# Patient Record
Sex: MALE | Race: WHITE | NOT HISPANIC OR LATINO | URBAN - METROPOLITAN AREA
[De-identification: names, ages, dates, MRNs, and addresses within clinical notes are randomized per-mention and may not be internally consistent; named-entity substitution may affect disease eponyms.]

---

## 2024-07-06 ENCOUNTER — HOSPITAL ENCOUNTER (EMERGENCY)
Facility: MEDICAL CENTER | Age: 39
End: 2024-07-06
Attending: EMERGENCY MEDICINE
Payer: COMMERCIAL

## 2024-07-06 VITALS
OXYGEN SATURATION: 98 % | HEIGHT: 72 IN | DIASTOLIC BLOOD PRESSURE: 92 MMHG | RESPIRATION RATE: 16 BRPM | HEART RATE: 80 BPM | TEMPERATURE: 97.3 F | SYSTOLIC BLOOD PRESSURE: 148 MMHG | WEIGHT: 220 LBS | BODY MASS INDEX: 29.8 KG/M2

## 2024-07-06 DIAGNOSIS — H60.332 ACUTE SWIMMER'S EAR OF LEFT SIDE: ICD-10-CM

## 2024-07-06 PROCEDURE — 96372 THER/PROPH/DIAG INJ SC/IM: CPT

## 2024-07-06 PROCEDURE — 700111 HCHG RX REV CODE 636 W/ 250 OVERRIDE (IP): Mod: JZ | Performed by: EMERGENCY MEDICINE

## 2024-07-06 PROCEDURE — 99283 EMERGENCY DEPT VISIT LOW MDM: CPT

## 2024-07-06 RX ORDER — CIPROFLOXACIN/HYDROCORTISONE 0.2 %-1 %
3 SUSPENSION, DROPS(FINAL DOSAGE FORM)(ML) OTIC (EAR) 2 TIMES DAILY
Qty: 5 ML | Refills: 0 | Status: ACTIVE | OUTPATIENT
Start: 2024-07-06 | End: 2024-07-13

## 2024-07-06 RX ORDER — KETOROLAC TROMETHAMINE 15 MG/ML
15 INJECTION, SOLUTION INTRAMUSCULAR; INTRAVENOUS ONCE
Status: COMPLETED | OUTPATIENT
Start: 2024-07-06 | End: 2024-07-06

## 2024-07-06 RX ADMIN — KETOROLAC TROMETHAMINE 15 MG: 15 INJECTION, SOLUTION INTRAMUSCULAR; INTRAVENOUS at 03:21

## 2024-07-06 ASSESSMENT — PAIN DESCRIPTION - DESCRIPTORS: DESCRIPTORS: SHARP;ACHING

## 2024-07-11 ENCOUNTER — HOSPITAL ENCOUNTER (EMERGENCY)
Facility: MEDICAL CENTER | Age: 39
End: 2024-07-11
Attending: EMERGENCY MEDICINE
Payer: COMMERCIAL

## 2024-07-11 VITALS
HEIGHT: 72 IN | HEART RATE: 74 BPM | SYSTOLIC BLOOD PRESSURE: 151 MMHG | TEMPERATURE: 97.9 F | DIASTOLIC BLOOD PRESSURE: 86 MMHG | RESPIRATION RATE: 16 BRPM | WEIGHT: 235.89 LBS | OXYGEN SATURATION: 99 % | BODY MASS INDEX: 31.95 KG/M2

## 2024-07-11 DIAGNOSIS — H66.92 LEFT OTITIS MEDIA, UNSPECIFIED OTITIS MEDIA TYPE: ICD-10-CM

## 2024-07-11 DIAGNOSIS — R42 DIZZINESS: ICD-10-CM

## 2024-07-11 DIAGNOSIS — H92.02 LEFT EAR PAIN: ICD-10-CM

## 2024-07-11 DIAGNOSIS — H72.92 PERFORATION OF LEFT TYMPANIC MEMBRANE: ICD-10-CM

## 2024-07-11 PROCEDURE — 700102 HCHG RX REV CODE 250 W/ 637 OVERRIDE(OP): Performed by: EMERGENCY MEDICINE

## 2024-07-11 PROCEDURE — 99283 EMERGENCY DEPT VISIT LOW MDM: CPT

## 2024-07-11 PROCEDURE — A9270 NON-COVERED ITEM OR SERVICE: HCPCS | Performed by: EMERGENCY MEDICINE

## 2024-07-11 RX ORDER — MECLIZINE HYDROCHLORIDE 25 MG/1
25 TABLET ORAL 3 TIMES DAILY PRN
Qty: 21 TABLET | Refills: 0 | Status: SHIPPED | OUTPATIENT
Start: 2024-07-11

## 2024-07-11 RX ORDER — AMOXICILLIN AND CLAVULANATE POTASSIUM 875; 125 MG/1; MG/1
1 TABLET, FILM COATED ORAL 2 TIMES DAILY
Qty: 20 TABLET | Refills: 0 | Status: ACTIVE | OUTPATIENT
Start: 2024-07-11

## 2024-07-11 RX ORDER — IBUPROFEN 600 MG/1
600 TABLET ORAL ONCE
Status: COMPLETED | OUTPATIENT
Start: 2024-07-11 | End: 2024-07-11

## 2024-07-11 RX ORDER — KETOROLAC TROMETHAMINE 10 MG/1
10 TABLET, FILM COATED ORAL 3 TIMES DAILY PRN
Qty: 15 TABLET | Refills: 0 | Status: SHIPPED | OUTPATIENT
Start: 2024-07-11

## 2024-07-11 RX ORDER — MECLIZINE HYDROCHLORIDE 25 MG/1
50 TABLET ORAL ONCE
Status: COMPLETED | OUTPATIENT
Start: 2024-07-11 | End: 2024-07-11

## 2024-07-11 RX ORDER — AMOXICILLIN AND CLAVULANATE POTASSIUM 875; 125 MG/1; MG/1
1 TABLET, FILM COATED ORAL ONCE
Status: COMPLETED | OUTPATIENT
Start: 2024-07-11 | End: 2024-07-11

## 2024-07-11 RX ADMIN — AMOXICILLIN AND CLAVULANATE POTASSIUM 1 TABLET: 875; 125 TABLET, FILM COATED ORAL at 21:35

## 2024-07-11 RX ADMIN — MECLIZINE HYDROCHLORIDE 50 MG: 25 TABLET ORAL at 21:36

## 2024-07-11 RX ADMIN — IBUPROFEN 600 MG: 600 TABLET, FILM COATED ORAL at 21:35

## 2024-11-26 ENCOUNTER — OFFICE VISIT (OUTPATIENT)
Dept: URGENT CARE | Facility: CLINIC | Age: 39
End: 2024-11-26
Payer: COMMERCIAL

## 2024-11-26 VITALS
WEIGHT: 228 LBS | HEIGHT: 72 IN | RESPIRATION RATE: 16 BRPM | SYSTOLIC BLOOD PRESSURE: 164 MMHG | OXYGEN SATURATION: 98 % | DIASTOLIC BLOOD PRESSURE: 100 MMHG | BODY MASS INDEX: 30.88 KG/M2 | TEMPERATURE: 98.5 F | HEART RATE: 101 BPM

## 2024-11-26 DIAGNOSIS — M54.40 ACUTE MIDLINE LOW BACK PAIN WITH SCIATICA, SCIATICA LATERALITY UNSPECIFIED: ICD-10-CM

## 2024-11-26 DIAGNOSIS — R03.0 ELEVATED BLOOD PRESSURE READING IN OFFICE WITHOUT DIAGNOSIS OF HYPERTENSION: ICD-10-CM

## 2024-11-26 PROCEDURE — 3080F DIAST BP >= 90 MM HG: CPT

## 2024-11-26 PROCEDURE — 3077F SYST BP >= 140 MM HG: CPT

## 2024-11-26 PROCEDURE — 99204 OFFICE O/P NEW MOD 45 MIN: CPT

## 2024-11-26 RX ORDER — KETOROLAC TROMETHAMINE 15 MG/ML
15 INJECTION, SOLUTION INTRAMUSCULAR; INTRAVENOUS ONCE
Status: COMPLETED | OUTPATIENT
Start: 2024-11-26 | End: 2024-11-26

## 2024-11-26 RX ORDER — ACETAMINOPHEN 500 MG
500 TABLET ORAL ONCE
Status: COMPLETED | OUTPATIENT
Start: 2024-11-26 | End: 2024-11-26

## 2024-11-26 RX ORDER — PREDNISONE 20 MG/1
20 TABLET ORAL 2 TIMES DAILY
Qty: 6 TABLET | Refills: 0 | Status: SHIPPED | OUTPATIENT
Start: 2024-11-26 | End: 2024-11-29

## 2024-11-26 RX ORDER — METHOCARBAMOL 500 MG/1
TABLET, FILM COATED ORAL
Qty: 15 TABLET | Refills: 0 | Status: SHIPPED | OUTPATIENT
Start: 2024-11-26

## 2024-11-26 RX ADMIN — Medication 500 MG: at 12:37

## 2024-11-26 RX ADMIN — KETOROLAC TROMETHAMINE 15 MG: 15 INJECTION, SOLUTION INTRAMUSCULAR; INTRAVENOUS at 12:36

## 2024-11-26 ASSESSMENT — ENCOUNTER SYMPTOMS
ABDOMINAL PAIN: 0
WEAKNESS: 0
FEVER: 0
FALLS: 0
CHILLS: 0
SENSORY CHANGE: 0
NAUSEA: 0
SHORTNESS OF BREATH: 0
BACK PAIN: 1
VOMITING: 0
TINGLING: 0

## 2024-11-26 NOTE — PROGRESS NOTES
Chief Complaint   Patient presents with    Back Pain     Sciatica going down left leg x 1 week, can barely walk       HISTORY OF PRESENT ILLNESS: Patient is a pleasant 39 y.o. male who presents to urgent care today noted ongoing back pain that radiates down the left leg for the last week.  Patient denies any recent trauma.  No pain with urination.  No nausea or vomiting.  He states he has taken ibuprofen, last dose was this morning with little to no relief.  He denies any loss of bowel or bladder.  He is currently ambulatory.    There are no active problems to display for this patient.      Allergies:Patient has no known allergies.    Current Outpatient Medications Ordered in Epic   Medication Sig Dispense Refill    predniSONE (DELTASONE) 20 MG Tab Take 1 Tablet by mouth 2 times a day for 3 days. 6 Tablet 0    methocarbamol (ROBAXIN) 500 MG Tab Take 1 to 2 tablets every 6 hours as needed for pain/muscle spasms. 15 Tablet 0    amoxicillin-clavulanate (AUGMENTIN) 875-125 MG Tab Take 1 Tablet by mouth 2 times a day. With food 20 Tablet 0    ketorolac (TORADOL) 10 MG Tab Take 1 Tablet by mouth 3 times a day as needed for Moderate Pain. With food 15 Tablet 0    meclizine (ANTIVERT) 25 MG Tab Take 1 Tablet by mouth 3 times a day as needed for Vertigo. 21 Tablet 0    hydrocodone-acetaminophen (VICODIN) 5-500 MG TABS Take 1-2 Tabs by mouth every four hours as needed for 20 doses. 20 Each 0     Current Facility-Administered Medications Ordered in Epic   Medication Dose Route Frequency Provider Last Rate Last Admin    ketorolac (Toradol) 15 MG/ML injection 15 mg  15 mg Intramuscular Once         acetaminophen (Tylenol) tablet 500 mg  500 mg Oral Once            Past Medical History:   Diagnosis Date    Allergy        Social History     Tobacco Use    Smoking status: Every Day     Types: Cigarettes    Smokeless tobacco: Never    Tobacco comments:     once in a while   Vaping Use    Vaping status: Never Used   Substance Use  Topics    Alcohol use: Yes     Alcohol/week: 3.5 oz     Types: 7 Cans of beer per week     Comment: daily    Drug use: Not Currently     Comment: marijuana       No family status information on file.   History reviewed. No pertinent family history.    Review of Systems   Constitutional:  Negative for chills, fever and malaise/fatigue.   Respiratory:  Negative for shortness of breath.    Cardiovascular:  Negative for chest pain.   Gastrointestinal:  Negative for abdominal pain, nausea and vomiting.   Genitourinary:  Negative for dysuria, frequency and urgency.   Musculoskeletal:  Positive for back pain. Negative for falls.   Neurological:  Negative for tingling, sensory change and weakness.       Exam:  BP (!) 164/100 (BP Location: Left arm, Patient Position: Sitting, BP Cuff Size: Adult)   Pulse (!) 101   Temp 36.9 °C (98.5 °F) (Temporal)   Resp 16   Ht 1.829 m (6')   Wt 103 kg (228 lb)   SpO2 98%   Physical Exam  Vitals reviewed.   Constitutional:       General: He is not in acute distress.     Appearance: Normal appearance.   HENT:      Head: Normocephalic.      Nose: Nose normal.      Mouth/Throat:      Mouth: Mucous membranes are moist.      Pharynx: Oropharynx is clear.   Eyes:      General:         Right eye: No discharge.         Left eye: No discharge.      Extraocular Movements: Extraocular movements intact.      Conjunctiva/sclera: Conjunctivae normal.      Pupils: Pupils are equal, round, and reactive to light.   Cardiovascular:      Rate and Rhythm: Regular rhythm. Tachycardia present.      Pulses: Normal pulses.      Heart sounds: Normal heart sounds. No murmur heard.     Comments: Noted elevated blood pressure of 164/100  Pulmonary:      Effort: Pulmonary effort is normal. No respiratory distress.      Breath sounds: Normal breath sounds. No wheezing.   Abdominal:      General: Abdomen is flat. Bowel sounds are normal.      Palpations: Abdomen is soft. There is no mass.      Tenderness: There is  no right CVA tenderness or left CVA tenderness.   Musculoskeletal:         General: Tenderness present. Normal range of motion.      Cervical back: Normal range of motion.      Right lower leg: No edema.      Left lower leg: No edema.      Comments: Especially noted with rotation on the left side   Lymphadenopathy:      Cervical: No cervical adenopathy.   Skin:     General: Skin is warm and dry.      Capillary Refill: Capillary refill takes less than 2 seconds.      Findings: No bruising.   Neurological:      General: No focal deficit present.      Mental Status: He is alert.      Sensory: No sensory deficit.      Motor: No weakness.      Coordination: Coordination normal.      Gait: Gait normal.   Psychiatric:         Mood and Affect: Mood normal.         Behavior: Behavior normal.         Assessment/Plan:  1. Acute midline low back pain with sciatica, sciatica laterality unspecified  - ketorolac (Toradol) 15 MG/ML injection 15 mg  - acetaminophen (Tylenol) tablet 500 mg  - predniSONE (DELTASONE) 20 MG Tab; Take 1 Tablet by mouth 2 times a day for 3 days.  Dispense: 6 Tablet; Refill: 0  - methocarbamol (ROBAXIN) 500 MG Tab; Take 1 to 2 tablets every 6 hours as needed for pain/muscle spasms.  Dispense: 15 Tablet; Refill: 0    2. Elevated blood pressure reading in office without diagnosis of hypertension  - Referral back to PCP    Based on physical exam along with review of systems I do not think the patient requires imaging, he has no recent trauma, no loss of bowel or bladder, he is currently ambulatory.  He denies any numbness or tingling.  He notes the pain radiates down his left leg.  Physical exam appears benign for the most part with mild tenderness noted especially with rotation at the left side.  Patient provided with Toradol and Tylenol today here in the office for comfort measures, he denies any recent issues with his kidneys.  Robaxin and a small course of prednisone sent to the pharmacy.  Patient advised  to not drive with Robaxin on board.    Patient has noted elevated hypertension today here in the office.  They are not currently on medications for this.  Education provided on uncontrolled hypertension as well as the need to keep a log.  Referral was placed for primary care, patient advised to follow-up with them and present to the log for ongoing management.  Patient currently denies any symptoms associated with hypertension. Total time spent with the patient 45 minutes to include, review of chart, charting, assessment, procedure.    Supportive care, differential diagnoses, and indications for immediate follow-up discussed with patient.   Pathogenesis of diagnosis discussed including typical length and natural progression.   Instructed to return to clinic or nearest emergency department for any change in condition, further concerns, or worsening of symptoms.  Patient states understanding of the plan of care and discharge instructions.  Instructed to make an appointment, for follow up, with primary care provider.    Please note that this dictation was created using voice recognition software. I have made every reasonable attempt to correct obvious errors, but I expect that there are errors of grammar and possibly content that I did not discover before finalizing the note.      Eunice BHATIA

## 2024-11-26 NOTE — LETTER
[facility]  [facility]  [address]  [place] [address]     November 26, 2024    Patient: Maury Kline   YOB: 1985   Date of Visit: 11/26/2024       To Whom It May Concern:    Maury Kline was seen and treated in our department on 11/26/2024.     Sincerely,     DENISE Babin.

## 2024-11-30 ENCOUNTER — HOSPITAL ENCOUNTER (EMERGENCY)
Facility: MEDICAL CENTER | Age: 39
End: 2024-11-30
Attending: EMERGENCY MEDICINE
Payer: COMMERCIAL

## 2024-11-30 ENCOUNTER — APPOINTMENT (OUTPATIENT)
Dept: RADIOLOGY | Facility: MEDICAL CENTER | Age: 39
End: 2024-11-30
Attending: EMERGENCY MEDICINE
Payer: COMMERCIAL

## 2024-11-30 VITALS
HEART RATE: 89 BPM | WEIGHT: 211.86 LBS | BODY MASS INDEX: 28.7 KG/M2 | TEMPERATURE: 97.5 F | RESPIRATION RATE: 18 BRPM | OXYGEN SATURATION: 95 % | DIASTOLIC BLOOD PRESSURE: 81 MMHG | SYSTOLIC BLOOD PRESSURE: 161 MMHG | HEIGHT: 72 IN

## 2024-11-30 DIAGNOSIS — M51.26 HERNIATION OF INTERVERTEBRAL DISC BETWEEN L4 AND L5: ICD-10-CM

## 2024-11-30 DIAGNOSIS — M54.32 SCIATICA OF LEFT SIDE: ICD-10-CM

## 2024-11-30 LAB
ALBUMIN SERPL BCP-MCNC: 4.5 G/DL (ref 3.2–4.9)
ALBUMIN/GLOB SERPL: 1.5 G/DL
ALP SERPL-CCNC: 61 U/L (ref 30–99)
ALT SERPL-CCNC: 36 U/L (ref 2–50)
ANION GAP SERPL CALC-SCNC: 16 MMOL/L (ref 7–16)
AST SERPL-CCNC: 27 U/L (ref 12–45)
BASOPHILS # BLD AUTO: 0.9 % (ref 0–1.8)
BASOPHILS # BLD: 0.08 K/UL (ref 0–0.12)
BILIRUB SERPL-MCNC: 0.8 MG/DL (ref 0.1–1.5)
BUN SERPL-MCNC: 12 MG/DL (ref 8–22)
CALCIUM ALBUM COR SERPL-MCNC: 9 MG/DL (ref 8.5–10.5)
CALCIUM SERPL-MCNC: 9.4 MG/DL (ref 8.4–10.2)
CHLORIDE SERPL-SCNC: 101 MMOL/L (ref 96–112)
CO2 SERPL-SCNC: 23 MMOL/L (ref 20–33)
CREAT SERPL-MCNC: 0.73 MG/DL (ref 0.5–1.4)
CRP SERPL HS-MCNC: 0.3 MG/L (ref 0–3)
EOSINOPHIL # BLD AUTO: 0.03 K/UL (ref 0–0.51)
EOSINOPHIL NFR BLD: 0.3 % (ref 0–6.9)
ERYTHROCYTE [DISTWIDTH] IN BLOOD BY AUTOMATED COUNT: 41 FL (ref 35.9–50)
ERYTHROCYTE [SEDIMENTATION RATE] IN BLOOD BY WESTERGREN METHOD: 0 MM/HOUR (ref 0–20)
GFR SERPLBLD CREATININE-BSD FMLA CKD-EPI: 120 ML/MIN/1.73 M 2
GLOBULIN SER CALC-MCNC: 3.1 G/DL (ref 1.9–3.5)
GLUCOSE SERPL-MCNC: 96 MG/DL (ref 65–99)
HCT VFR BLD AUTO: 50.2 % (ref 42–52)
HGB BLD-MCNC: 17.8 G/DL (ref 14–18)
IMM GRANULOCYTES # BLD AUTO: 0.06 K/UL (ref 0–0.11)
IMM GRANULOCYTES NFR BLD AUTO: 0.7 % (ref 0–0.9)
LYMPHOCYTES # BLD AUTO: 1.12 K/UL (ref 1–4.8)
LYMPHOCYTES NFR BLD: 12.8 % (ref 22–41)
MCH RBC QN AUTO: 32.7 PG (ref 27–33)
MCHC RBC AUTO-ENTMCNC: 35.5 G/DL (ref 32.3–36.5)
MCV RBC AUTO: 92.3 FL (ref 81.4–97.8)
MONOCYTES # BLD AUTO: 0.76 K/UL (ref 0–0.85)
MONOCYTES NFR BLD AUTO: 8.7 % (ref 0–13.4)
NEUTROPHILS # BLD AUTO: 6.72 K/UL (ref 1.82–7.42)
NEUTROPHILS NFR BLD: 76.6 % (ref 44–72)
NRBC # BLD AUTO: 0 K/UL
NRBC BLD-RTO: 0 /100 WBC (ref 0–0.2)
PLATELET # BLD AUTO: 279 K/UL (ref 164–446)
PMV BLD AUTO: 8.7 FL (ref 9–12.9)
POTASSIUM SERPL-SCNC: 3.7 MMOL/L (ref 3.6–5.5)
PROT SERPL-MCNC: 7.6 G/DL (ref 6–8.2)
RBC # BLD AUTO: 5.44 M/UL (ref 4.7–6.1)
SODIUM SERPL-SCNC: 140 MMOL/L (ref 135–145)
WBC # BLD AUTO: 8.8 K/UL (ref 4.8–10.8)

## 2024-11-30 PROCEDURE — 96374 THER/PROPH/DIAG INJ IV PUSH: CPT

## 2024-11-30 PROCEDURE — 80053 COMPREHEN METABOLIC PANEL: CPT

## 2024-11-30 PROCEDURE — 85652 RBC SED RATE AUTOMATED: CPT

## 2024-11-30 PROCEDURE — 700102 HCHG RX REV CODE 250 W/ 637 OVERRIDE(OP): Performed by: STUDENT IN AN ORGANIZED HEALTH CARE EDUCATION/TRAINING PROGRAM

## 2024-11-30 PROCEDURE — 700111 HCHG RX REV CODE 636 W/ 250 OVERRIDE (IP): Mod: JZ | Performed by: EMERGENCY MEDICINE

## 2024-11-30 PROCEDURE — 36415 COLL VENOUS BLD VENIPUNCTURE: CPT

## 2024-11-30 PROCEDURE — 700111 HCHG RX REV CODE 636 W/ 250 OVERRIDE (IP): Performed by: STUDENT IN AN ORGANIZED HEALTH CARE EDUCATION/TRAINING PROGRAM

## 2024-11-30 PROCEDURE — 72148 MRI LUMBAR SPINE W/O DYE: CPT

## 2024-11-30 PROCEDURE — 96375 TX/PRO/DX INJ NEW DRUG ADDON: CPT

## 2024-11-30 PROCEDURE — 86141 C-REACTIVE PROTEIN HS: CPT

## 2024-11-30 PROCEDURE — 99285 EMERGENCY DEPT VISIT HI MDM: CPT

## 2024-11-30 PROCEDURE — A9270 NON-COVERED ITEM OR SERVICE: HCPCS | Performed by: STUDENT IN AN ORGANIZED HEALTH CARE EDUCATION/TRAINING PROGRAM

## 2024-11-30 PROCEDURE — 96376 TX/PRO/DX INJ SAME DRUG ADON: CPT

## 2024-11-30 PROCEDURE — 85025 COMPLETE CBC W/AUTO DIFF WBC: CPT

## 2024-11-30 RX ORDER — DIAZEPAM 10 MG/2ML
5 INJECTION, SOLUTION INTRAMUSCULAR; INTRAVENOUS ONCE
Status: COMPLETED | OUTPATIENT
Start: 2024-11-30 | End: 2024-11-30

## 2024-11-30 RX ORDER — ACETAMINOPHEN 500 MG
1000 TABLET ORAL ONCE
Status: COMPLETED | OUTPATIENT
Start: 2024-11-30 | End: 2024-11-30

## 2024-11-30 RX ORDER — DOCUSATE SODIUM 100 MG/1
100 CAPSULE, LIQUID FILLED ORAL 2 TIMES DAILY PRN
Qty: 30 CAPSULE | Refills: 0 | Status: SHIPPED | OUTPATIENT
Start: 2024-11-30

## 2024-11-30 RX ORDER — IBUPROFEN 600 MG/1
600 TABLET, FILM COATED ORAL EVERY 6 HOURS PRN
Qty: 30 TABLET | Refills: 0 | Status: SHIPPED | OUTPATIENT
Start: 2024-11-30

## 2024-11-30 RX ORDER — LIDOCAINE 4 G/G
1 PATCH TOPICAL EVERY 12 HOURS
Qty: 1 PATCH | Refills: 0 | Status: SHIPPED | OUTPATIENT
Start: 2024-11-30

## 2024-11-30 RX ORDER — METHOCARBAMOL 500 MG/1
500 TABLET, FILM COATED ORAL ONCE
Status: COMPLETED | OUTPATIENT
Start: 2024-11-30 | End: 2024-11-30

## 2024-11-30 RX ORDER — MORPHINE SULFATE 4 MG/ML
4 INJECTION INTRAVENOUS ONCE
Status: COMPLETED | OUTPATIENT
Start: 2024-11-30 | End: 2024-11-30

## 2024-11-30 RX ORDER — KETOROLAC TROMETHAMINE 15 MG/ML
15 INJECTION, SOLUTION INTRAMUSCULAR; INTRAVENOUS ONCE
Status: COMPLETED | OUTPATIENT
Start: 2024-11-30 | End: 2024-11-30

## 2024-11-30 RX ORDER — MORPHINE SULFATE 4 MG/ML
4 INJECTION INTRAVENOUS ONCE
Status: DISCONTINUED | OUTPATIENT
Start: 2024-11-30 | End: 2024-11-30

## 2024-11-30 RX ORDER — HYDROMORPHONE HYDROCHLORIDE 1 MG/ML
1 INJECTION, SOLUTION INTRAMUSCULAR; INTRAVENOUS; SUBCUTANEOUS ONCE
Status: COMPLETED | OUTPATIENT
Start: 2024-11-30 | End: 2024-11-30

## 2024-11-30 RX ORDER — OXYCODONE HYDROCHLORIDE 5 MG/1
5-10 TABLET ORAL EVERY 6 HOURS PRN
Qty: 20 TABLET | Refills: 0 | Status: SHIPPED | OUTPATIENT
Start: 2024-11-30 | End: 2024-12-05

## 2024-11-30 RX ORDER — ONDANSETRON 2 MG/ML
4 INJECTION INTRAMUSCULAR; INTRAVENOUS ONCE
Status: COMPLETED | OUTPATIENT
Start: 2024-11-30 | End: 2024-11-30

## 2024-11-30 RX ORDER — ALBUTEROL SULFATE 0.83 MG/ML
2.5 SOLUTION RESPIRATORY (INHALATION) EVERY 4 HOURS PRN
Qty: 30 EACH | Refills: 0 | Status: SHIPPED | OUTPATIENT
Start: 2024-11-30

## 2024-11-30 RX ORDER — METHOCARBAMOL 750 MG/1
750 TABLET, FILM COATED ORAL EVERY 6 HOURS PRN
Qty: 20 TABLET | Refills: 0 | Status: SHIPPED | OUTPATIENT
Start: 2024-11-30 | End: 2024-12-16 | Stop reason: SDUPTHER

## 2024-11-30 RX ADMIN — KETOROLAC TROMETHAMINE 15 MG: 15 INJECTION, SOLUTION INTRAMUSCULAR; INTRAVENOUS at 14:19

## 2024-11-30 RX ADMIN — METHOCARBAMOL 500 MG: 500 TABLET ORAL at 16:00

## 2024-11-30 RX ADMIN — MORPHINE SULFATE 4 MG: 4 INJECTION, SOLUTION INTRAMUSCULAR; INTRAVENOUS at 14:19

## 2024-11-30 RX ADMIN — ONDANSETRON 4 MG: 2 INJECTION INTRAMUSCULAR; INTRAVENOUS at 10:40

## 2024-11-30 RX ADMIN — DIAZEPAM 5 MG: 10 INJECTION, SOLUTION INTRAMUSCULAR; INTRAVENOUS at 13:45

## 2024-11-30 RX ADMIN — DIAZEPAM 5 MG: 5 INJECTION, SOLUTION INTRAMUSCULAR; INTRAVENOUS at 12:43

## 2024-11-30 RX ADMIN — HYDROMORPHONE HYDROCHLORIDE 1 MG: 1 INJECTION, SOLUTION INTRAMUSCULAR; INTRAVENOUS; SUBCUTANEOUS at 16:00

## 2024-11-30 RX ADMIN — MORPHINE SULFATE 4 MG: 4 INJECTION, SOLUTION INTRAMUSCULAR; INTRAVENOUS at 10:40

## 2024-11-30 RX ADMIN — ACETAMINOPHEN 1000 MG: 500 TABLET ORAL at 16:00

## 2024-11-30 NOTE — DISCHARGE INSTRUCTIONS
Please call for appointment with spine surgeon.  Finish your prescription of steroids as scheduled.  If worse, new numbness or weakness, loss of bowel or bladder continence, go to [facility] emergency department

## 2024-11-30 NOTE — ED TRIAGE NOTES
BP (!) 163/111   Pulse (!) 109   Temp 36.4 °C (97.5 °F) (Temporal)   Resp 20   Ht 1.829 m (6')   Wt 96.1 kg (211 lb 13.8 oz)   SpO2 95%   BMI 28.73 kg/m²   Chief Complaint   Patient presents with    Pain     L buttock pain that started about a week ago   was seen at  this past week  the gave him a shot that did not work   denies injury or cause of pain   progressively getting worse   having numbness and tingling down L leg  no loss of bowel or bladder the last couple of days      Comes in w/ wife   also was given steroid medication from   all med's not working  continues to have severe pain

## 2024-11-30 NOTE — ED PROVIDER NOTES
ED Provider Note    CHIEF COMPLAINT  Chief Complaint   Patient presents with    Pain     L buttock pain that started about a week ago   was seen at  this past week  the gave him a shot that did not work   denies injury or cause of pain   progressively getting worse   having numbness and tingling down L leg  no loss of bowel or bladder the last couple of days        EXTERNAL RECORDS REVIEWED  PDMP no active prescriptions for narcotics or sedatives    HPI/ROS  LIMITATION TO HISTORY   Select: : None  OUTSIDE HISTORIAN(S):  Significant other patient's wife is at bedside for discussion history and symptoms, treatment up-to-date from the urgent care    Maury Kline is a 39 y.o. male who presents with left leg lumbar pain radiating to the left buttock down the left leg to the mid calf.  Slight tingling and numbness to the left foot.  He feels weak in his left leg, also pain markedly increases with any attempt to bear weight.  No trauma.  No recent surgery, no diabetes, no IVDA.  He denies bowel or bladder incontinence, no saddle paresthesias.  Patient had similar discomfort while pushing heavy object 2 years ago, resolved after medication.  He went to an urgent care 3 days ago, placed on prednisone, muscle relaxer, and administered Toradol shot.  He states the discomfort and weakness have worsened despite the medication.  No abdominal pain.    PAST MEDICAL HISTORY   has a past medical history of Allergy.    SURGICAL HISTORY   has a past surgical history that includes tonsillectomy and other orthopedic surgery.    FAMILY HISTORY  History reviewed. No pertinent family history.    SOCIAL HISTORY  Social History     Tobacco Use    Smoking status: Never    Smokeless tobacco: Never    Tobacco comments:     once in a while   Vaping Use    Vaping status: Never Used   Substance and Sexual Activity    Alcohol use: Yes     Alcohol/week: 3.5 oz     Types: 7 Cans of beer per week     Comment: daily    Drug use: Not Currently     Sexual activity: Yes     Partners: Female       CURRENT MEDICATIONS  Home Medications       Reviewed by Cyndy Strauss R.N. (Registered Nurse) on 11/30/24 at 0921  Med List Status: Partial     Medication Last Dose Status   amoxicillin-clavulanate (AUGMENTIN) 875-125 MG Tab  Active   hydrocodone-acetaminophen (VICODIN) 5-500 MG TABS  Active   ketorolac (TORADOL) 10 MG Tab  Active   meclizine (ANTIVERT) 25 MG Tab  Active   methocarbamol (ROBAXIN) 500 MG Tab  Active                    ALLERGIES  No Known Allergies    PHYSICAL EXAM  VITAL SIGNS: BP (!) 163/111   Pulse (!) 109   Temp 36.4 °C (97.5 °F) (Temporal)   Resp 20   Ht 1.829 m (6')   Wt 96.1 kg (211 lb 13.8 oz)   SpO2 95%   BMI 28.73 kg/m²    Neurologic: Proximal and lower right leg weakness is mild compared to left.  There is increase in pain with range of motion possibly affecting his strength.  Sensation is intact to the right foot.  Vascular: Normal right dorsal pedis pulse  Skin: No shingles, no pallor, no bruising  GI: Abdomen is soft and nontender  Cardiac: Tachycardic rate  Eyes: No scleral icterus, pupils are equal  Psychiatric: Normal mood, cooperative    EKG/LABS  Results for orders placed or performed during the hospital encounter of 11/30/24   CBC WITH DIFFERENTIAL    Collection Time: 11/30/24 10:34 AM   Result Value Ref Range    WBC 8.8 4.8 - 10.8 K/uL    RBC 5.44 4.70 - 6.10 M/uL    Hemoglobin 17.8 14.0 - 18.0 g/dL    Hematocrit 50.2 42.0 - 52.0 %    MCV 92.3 81.4 - 97.8 fL    MCH 32.7 27.0 - 33.0 pg    MCHC 35.5 32.3 - 36.5 g/dL    RDW 41.0 35.9 - 50.0 fL    Platelet Count 279 164 - 446 K/uL    MPV 8.7 (L) 9.0 - 12.9 fL    Neutrophils-Polys 76.60 (H) 44.00 - 72.00 %    Lymphocytes 12.80 (L) 22.00 - 41.00 %    Monocytes 8.70 0.00 - 13.40 %    Eosinophils 0.30 0.00 - 6.90 %    Basophils 0.90 0.00 - 1.80 %    Immature Granulocytes 0.70 0.00 - 0.90 %    Nucleated RBC 0.00 0.00 - 0.20 /100 WBC    Neutrophils (Absolute) 6.72 1.82 - 7.42 K/uL     Lymphs (Absolute) 1.12 1.00 - 4.80 K/uL    Monos (Absolute) 0.76 0.00 - 0.85 K/uL    Eos (Absolute) 0.03 0.00 - 0.51 K/uL    Baso (Absolute) 0.08 0.00 - 0.12 K/uL    Immature Granulocytes (abs) 0.06 0.00 - 0.11 K/uL    NRBC (Absolute) 0.00 K/uL   COMP METABOLIC PANEL    Collection Time: 11/30/24 10:34 AM   Result Value Ref Range    Sodium 140 135 - 145 mmol/L    Potassium 3.7 3.6 - 5.5 mmol/L    Chloride 101 96 - 112 mmol/L    Co2 23 20 - 33 mmol/L    Anion Gap 16.0 7.0 - 16.0    Glucose 96 65 - 99 mg/dL    Bun 12 8 - 22 mg/dL    Creatinine 0.73 0.50 - 1.40 mg/dL    Calcium 9.4 8.4 - 10.2 mg/dL    Correct Calcium 9.0 8.5 - 10.5 mg/dL    AST(SGOT) 27 12 - 45 U/L    ALT(SGPT) 36 2 - 50 U/L    Alkaline Phosphatase 61 30 - 99 U/L    Total Bilirubin 0.8 0.1 - 1.5 mg/dL    Albumin 4.5 3.2 - 4.9 g/dL    Total Protein 7.6 6.0 - 8.2 g/dL    Globulin 3.1 1.9 - 3.5 g/dL    A-G Ratio 1.5 g/dL   Sed Rate    Collection Time: 11/30/24 10:34 AM   Result Value Ref Range    Sed Rate Westergren 0 0 - 20 mm/hour   CRP HIGH SENSITIVE (CARDIAC)    Collection Time: 11/30/24 10:34 AM   Result Value Ref Range    C Reactive Protein High Sensitive 0.3 0.0 - 3.0 mg/L   ESTIMATED GFR    Collection Time: 11/30/24 10:34 AM   Result Value Ref Range    GFR (CKD-EPI) 120 >60 mL/min/1.73 m 2       I have independently interpreted this EKG    RADIOLOGY/PROCEDURES   I have independently interpreted the diagnostic imaging associated with this visit and am waiting the final reading from the radiologist.   My preliminary interpretation is as follows: MRI of the lumbar spine shows evidence of disc herniation at L4-5    Radiologist interpretation:  MR-LUMBAR SPINE-W/O   Final Result      1.  Probable acute left posterior paramedian disc herniation with small extruded disc fragment extending caudally in the ventral epidural space proximal 0.9 mm. There is slight extension of the disc fragment into the left L4-5 inferior foraminal recess    which may  compress left L4 nerve root as it exits the canal as well as additional left-sided descending nerve roots.   2.  Additional mild degenerative changes at the L5-S1 level.   3.  No acute fracture or malalignment.          COURSE & MEDICAL DECISION MAKING    ASSESSMENT, COURSE AND PLAN  Care Narrative: Patient presents with progressively worsening pain despite conservative outpatient management including steroids and muscle relaxers.  His pain was treated twice with morphine.  He suffered panic attack while in the MRI machine, had to go back twice, and has been treated with a total of 10 mg IV Valium ultimately allowing him to get the study.  MRI shows finding of herniated disc with small herniated fragment compressing the left L4 nerve root, likely etiology of his sciatica symptoms.  There is no evidence of cauda equina syndrome.  Patient is still having difficulty moving.  I have advanced his medications at home, prescribing oxycodone in addition to refilling his muscle relaxer.  At this time,'s patient states he would prefer to go home.  I have referred him to spine surgery on-call for follow-up and provided return instructions, directing him to [facility] should symptoms worsen.  If needed, there would be consideration for admission for pain control, plan however is outpatient management at this time after discussion with the patient    Narcotics Script: In prescribing controlled substances to this patient, I certify that I have obtained and reviewed the medical history of Maury Kline. I have also made a good evangelista effort to obtain applicable records from other providers who have treated the patient and records did not demonstrate any increased risk of substance abuse that would prevent me from prescribing controlled substances.     I have conducted a physical exam and documented it. I have reviewed Mr. Kline’s prescription history as maintained by the [place] Prescription Monitoring Program.      I have assessed the patient’s risk for abuse, dependency, and addiction using the validated Opioid Risk Tool available at https://www.mdcalc.com/zmbtrv-giss-jkny-ort-narcotic-abuse.     Given the above, I believe the benefits of controlled substance therapy outweigh the risks. The reasons for prescribing controlled substances include non-narcotic, oral analgesic alternatives have been inadequate for pain control. Accordingly, I have discussed the risk and benefits, treatment plan, and alternative therapies with the patient.           ADDITIONAL PROBLEMS MANAGED  Atraumatic back pain: No evidence of infection at this time, MRI negative for epidural abscess, patient has negative CRP, sedimentation rate, and white blood cell count.    DISPOSITION AND DISCUSSIONS    Escalation of care considered, and ultimately not performed:acute inpatient care management, however at this time, the patient is most appropriate for outpatient management    Barriers to care at this time, including but not limited to:  None .     Decision tools and prescription drugs considered including, but not limited to: Antibiotics not indicated, no evidence of epidural abscess .    FINAL DIAGNOSIS  1. Sciatica of left side    2. Herniation of intervertebral disc between L4 and L5         Electronically signed by: Ulises Holcomb M.D., 11/30/2024 10:22 AM

## 2024-12-01 NOTE — ED NOTES
Dc instructions and medications discussed with patient at bedside. All questions answered at this time. VSS. No IV in place at this time. Pt to lobby without incident. spouse to drive patient home.     
MRI requesting sedation medication for claustrophobia for 2nd attempt at MRI. Primary RN and ERP notified.   
Pt. Unable to get OOB d/t pain. Dr. Holcomb aware and at bedside for re-eval.   
No

## 2024-12-02 ENCOUNTER — APPOINTMENT (OUTPATIENT)
Dept: MEDICAL GROUP | Facility: LAB | Age: 39
End: 2024-12-02
Payer: COMMERCIAL

## 2024-12-02 VITALS
DIASTOLIC BLOOD PRESSURE: 74 MMHG | SYSTOLIC BLOOD PRESSURE: 146 MMHG | WEIGHT: 229 LBS | HEIGHT: 72 IN | BODY MASS INDEX: 31.02 KG/M2 | TEMPERATURE: 96.8 F | OXYGEN SATURATION: 96 % | HEART RATE: 109 BPM

## 2024-12-02 DIAGNOSIS — M54.10 BACK PAIN WITH LEFT-SIDED RADICULOPATHY: ICD-10-CM

## 2024-12-02 DIAGNOSIS — Z76.89 ESTABLISHING CARE WITH NEW DOCTOR, ENCOUNTER FOR: ICD-10-CM

## 2024-12-02 PROCEDURE — 99203 OFFICE O/P NEW LOW 30 MIN: CPT | Performed by: INTERNAL MEDICINE

## 2024-12-02 SDOH — ECONOMIC STABILITY: INCOME INSECURITY: HOW HARD IS IT FOR YOU TO PAY FOR THE VERY BASICS LIKE FOOD, HOUSING, MEDICAL CARE, AND HEATING?: NOT HARD AT ALL

## 2024-12-02 SDOH — ECONOMIC STABILITY: TRANSPORTATION INSECURITY
IN THE PAST 12 MONTHS, HAS LACK OF TRANSPORTATION KEPT YOU FROM MEETINGS, WORK, OR FROM GETTING THINGS NEEDED FOR DAILY LIVING?: NO

## 2024-12-02 SDOH — ECONOMIC STABILITY: TRANSPORTATION INSECURITY
IN THE PAST 12 MONTHS, HAS THE LACK OF TRANSPORTATION KEPT YOU FROM MEDICAL APPOINTMENTS OR FROM GETTING MEDICATIONS?: NO

## 2024-12-02 SDOH — HEALTH STABILITY: MENTAL HEALTH
STRESS IS WHEN SOMEONE FEELS TENSE, NERVOUS, ANXIOUS, OR CAN'T SLEEP AT NIGHT BECAUSE THEIR MIND IS TROUBLED. HOW STRESSED ARE YOU?: ONLY A LITTLE

## 2024-12-02 SDOH — ECONOMIC STABILITY: HOUSING INSECURITY
IN THE LAST 12 MONTHS, WAS THERE A TIME WHEN YOU DID NOT HAVE A STEADY PLACE TO SLEEP OR SLEPT IN A SHELTER (INCLUDING NOW)?: NO

## 2024-12-02 SDOH — ECONOMIC STABILITY: FOOD INSECURITY: WITHIN THE PAST 12 MONTHS, YOU WORRIED THAT YOUR FOOD WOULD RUN OUT BEFORE YOU GOT MONEY TO BUY MORE.: NEVER TRUE

## 2024-12-02 SDOH — HEALTH STABILITY: PHYSICAL HEALTH: ON AVERAGE, HOW MANY DAYS PER WEEK DO YOU ENGAGE IN MODERATE TO STRENUOUS EXERCISE (LIKE A BRISK WALK)?: 3 DAYS

## 2024-12-02 SDOH — HEALTH STABILITY: PHYSICAL HEALTH: ON AVERAGE, HOW MANY MINUTES DO YOU ENGAGE IN EXERCISE AT THIS LEVEL?: 30 MIN

## 2024-12-02 SDOH — ECONOMIC STABILITY: INCOME INSECURITY: IN THE LAST 12 MONTHS, WAS THERE A TIME WHEN YOU WERE NOT ABLE TO PAY THE MORTGAGE OR RENT ON TIME?: NO

## 2024-12-02 SDOH — ECONOMIC STABILITY: FOOD INSECURITY: WITHIN THE PAST 12 MONTHS, THE FOOD YOU BOUGHT JUST DIDN'T LAST AND YOU DIDN'T HAVE MONEY TO GET MORE.: NEVER TRUE

## 2024-12-02 SDOH — ECONOMIC STABILITY: TRANSPORTATION INSECURITY
IN THE PAST 12 MONTHS, HAS LACK OF RELIABLE TRANSPORTATION KEPT YOU FROM MEDICAL APPOINTMENTS, MEETINGS, WORK OR FROM GETTING THINGS NEEDED FOR DAILY LIVING?: NO

## 2024-12-02 ASSESSMENT — SOCIAL DETERMINANTS OF HEALTH (SDOH)
HOW OFTEN DO YOU ATTENT MEETINGS OF THE CLUB OR ORGANIZATION YOU BELONG TO?: PATIENT DECLINED
HOW MANY DRINKS CONTAINING ALCOHOL DO YOU HAVE ON A TYPICAL DAY WHEN YOU ARE DRINKING: 1 OR 2
HOW OFTEN DO YOU ATTEND CHURCH OR RELIGIOUS SERVICES?: PATIENT DECLINED
HOW OFTEN DO YOU GET TOGETHER WITH FRIENDS OR RELATIVES?: THREE TIMES A WEEK
WITHIN THE PAST 12 MONTHS, YOU WORRIED THAT YOUR FOOD WOULD RUN OUT BEFORE YOU GOT THE MONEY TO BUY MORE: NEVER TRUE
DO YOU BELONG TO ANY CLUBS OR ORGANIZATIONS SUCH AS CHURCH GROUPS UNIONS, FRATERNAL OR ATHLETIC GROUPS, OR SCHOOL GROUPS?: PATIENT DECLINED
IN A TYPICAL WEEK, HOW MANY TIMES DO YOU TALK ON THE PHONE WITH FAMILY, FRIENDS, OR NEIGHBORS?: MORE THAN THREE TIMES A WEEK
HOW OFTEN DO YOU ATTENT MEETINGS OF THE CLUB OR ORGANIZATION YOU BELONG TO?: PATIENT DECLINED
HOW OFTEN DO YOU GET TOGETHER WITH FRIENDS OR RELATIVES?: THREE TIMES A WEEK
IN A TYPICAL WEEK, HOW MANY TIMES DO YOU TALK ON THE PHONE WITH FAMILY, FRIENDS, OR NEIGHBORS?: MORE THAN THREE TIMES A WEEK
HOW OFTEN DO YOU ATTEND CHURCH OR RELIGIOUS SERVICES?: PATIENT DECLINED
DO YOU BELONG TO ANY CLUBS OR ORGANIZATIONS SUCH AS CHURCH GROUPS UNIONS, FRATERNAL OR ATHLETIC GROUPS, OR SCHOOL GROUPS?: PATIENT DECLINED
HOW HARD IS IT FOR YOU TO PAY FOR THE VERY BASICS LIKE FOOD, HOUSING, MEDICAL CARE, AND HEATING?: NOT HARD AT ALL
HOW OFTEN DO YOU HAVE A DRINK CONTAINING ALCOHOL: 4 OR MORE TIMES A WEEK
HOW OFTEN DO YOU HAVE SIX OR MORE DRINKS ON ONE OCCASION: LESS THAN MONTHLY
IN THE PAST 12 MONTHS, HAS THE ELECTRIC, GAS, OIL, OR WATER COMPANY THREATENED TO SHUT OFF SERVICE IN YOUR HOME?: NO

## 2024-12-02 ASSESSMENT — PATIENT HEALTH QUESTIONNAIRE - PHQ9: CLINICAL INTERPRETATION OF PHQ2 SCORE: 0

## 2024-12-02 ASSESSMENT — LIFESTYLE VARIABLES
AUDIT-C TOTAL SCORE: 5
SKIP TO QUESTIONS 9-10: 0
HOW OFTEN DO YOU HAVE SIX OR MORE DRINKS ON ONE OCCASION: LESS THAN MONTHLY
HOW OFTEN DO YOU HAVE A DRINK CONTAINING ALCOHOL: 4 OR MORE TIMES A WEEK
HOW MANY STANDARD DRINKS CONTAINING ALCOHOL DO YOU HAVE ON A TYPICAL DAY: 1 OR 2

## 2024-12-02 ASSESSMENT — FIBROSIS 4 INDEX: FIB4 SCORE: .5967741935483870968

## 2024-12-02 NOTE — PROGRESS NOTES
CC: Maury Kline is a 39 y.o. male is suffering from   Chief Complaint   Patient presents with    Establish Care     Lower back pain         SUBJECTIVE:  1. Establishing care with new doctor, encounter for  Patient is here for establishment of care recently hospitalized secondary to severe back pain later found to have on MRI a disc herniation with a fragment into the left L4-5 foraminal recess    2. Back pain with left-sided radiculopathy    Intensity 10/10 on Saturday currently 5-6/10, quality sharp electrical , radiation left leg, associated signs and symptoms alyssa bowel bladder function, time component worse in the morning morning, improved with limited activity, worse with bending. .        Past social, family, history: , works in computer science  Social History     Tobacco Use    Smoking status: Never    Smokeless tobacco: Never    Tobacco comments:     once in a while   Vaping Use    Vaping status: Never Used   Substance Use Topics    Alcohol use: Yes     Alcohol/week: 4.2 oz     Types: 7 Cans of beer per week     Comment: daily    Drug use: Not Currently         MEDICATIONS:    Current Outpatient Medications:     oxyCODONE immediate-release (ROXICODONE) 5 MG Tab, Take 1-2 Tablets by mouth every 6 hours as needed for Severe Pain for up to 5 days., Disp: 20 Tablet, Rfl: 0    methocarbamol (ROBAXIN) 750 MG Tab, Take 1 Tablet by mouth every 6 hours as needed (Muscle spasm)., Disp: 20 Tablet, Rfl: 0    albuterol (PROVENTIL) 2.5mg/3ml Nebu Soln solution for nebulization, Take 3 mL by nebulization every four hours as needed for Shortness of Breath., Disp: 30 Each, Rfl: 0    ibuprofen (MOTRIN) 600 MG Tab, Take 1 Tablet by mouth every 6 hours as needed for Mild Pain or Moderate Pain., Disp: 30 Tablet, Rfl: 0    lidocaine (ASPERFLEX) 4 % Patch, Place 1 Patch on the skin every 12 hours., Disp: 1 Patch, Rfl: 0    docusate sodium (COLACE) 100 MG Cap, Take 1 Capsule by mouth 2 times a day as needed for  Constipation (Take to prevent opioid-induced constipation)., Disp: 30 Capsule, Rfl: 0    methocarbamol (ROBAXIN) 500 MG Tab, Take 1 to 2 tablets every 6 hours as needed for pain/muscle spasms. (Patient not taking: Reported on 12/2/2024), Disp: 15 Tablet, Rfl: 0    amoxicillin-clavulanate (AUGMENTIN) 875-125 MG Tab, Take 1 Tablet by mouth 2 times a day. With food (Patient not taking: Reported on 12/2/2024), Disp: 20 Tablet, Rfl: 0    ketorolac (TORADOL) 10 MG Tab, Take 1 Tablet by mouth 3 times a day as needed for Moderate Pain. With food (Patient not taking: Reported on 12/2/2024), Disp: 15 Tablet, Rfl: 0    meclizine (ANTIVERT) 25 MG Tab, Take 1 Tablet by mouth 3 times a day as needed for Vertigo. (Patient not taking: Reported on 12/2/2024), Disp: 21 Tablet, Rfl: 0    hydrocodone-acetaminophen (VICODIN) 5-500 MG TABS, Take 1-2 Tabs by mouth every four hours as needed for 20 doses. (Patient not taking: Reported on 12/2/2024), Disp: 20 Each, Rfl: 0    PROBLEMS:  There are no active problems to display for this patient.      REVIEW OF SYSTEMS:  General: Patient denies any problems with nausea vomiting fever chills chest pain or tightness.  Head:  No history of strokes seizures severe head trauma or loss of consciousness.   HEENT: No history of any significant vision loss, hearing loss, changes in sense of smell hoarseness  Heart: No chest pain tightness squeezing.   Lungs: No recurrent asthma bronchitis pneumonia.   Gastrointestinal: No history of black or bloody stools.  Denies rectal numbness  Genitourinary:  No increased frequency urgency pain and burning with urination denies numbness in the perineum  Musculoskeletal: No joint pain or discomfort.   Skin: No skin changes      PHYSICAL EXAM      Constitutional: Alert, cooperative, not in acute distress.  Cardiovascular:  Rate Rhythm is regular without murmurs rubs clicks.     Thorax & Lungs: Clear to auscultation, no wheezing, rhonchi, or rales  HENT: Normocephalic,  Atraumatic.  Eyes: PERRLA, EOMI, Conjunctiva normal.   Neck: Trachia is midline no swelling of the thyroid.   Lymphatic: No lymphadenopathy noted.   Abdomin: Soft non-tender, no rebound, no guarding.   Skin: Warm, Dry, No erythema, No rash.   Extremities: Atraumatic with symmetric distal pulses, No edema, No tenderness, No cyanosis, No clubbing.   Musculoskeletal: Positive straight leg raising test left leg  Neurologic: Alert & oriented x 3, cranial nerves II through XII are intact, Normal motor function, Normal sensory function, No focal deficits noted.   Psychiatric: Affect normal, Judgment normal, Mood normal.     VITAL SIGNS:BP (!) 146/74   Pulse (!) 109   Temp 36 °C (96.8 °F) (Temporal)   Ht 1.829 m (6')   Wt 104 kg (229 lb)   SpO2 96%   BMI 31.06 kg/m²     Labs: Reviewed    Assessment:                                                     Plan:     1. Establishing care with new doctor, encounter for  Patient overall is medically stable    2. Back pain with left-sided radiculopathy  Because of disc herniation left lateral recess have requested patient follow-up with neurosurgery for evaluation.   - Referral to Neurosurgery

## 2024-12-05 ENCOUNTER — PATIENT MESSAGE (OUTPATIENT)
Dept: MEDICAL GROUP | Facility: LAB | Age: 39
End: 2024-12-05
Payer: COMMERCIAL

## 2024-12-06 ENCOUNTER — PATIENT MESSAGE (OUTPATIENT)
Dept: MEDICAL GROUP | Facility: LAB | Age: 39
End: 2024-12-06
Payer: COMMERCIAL

## 2025-01-13 ENCOUNTER — OFFICE VISIT (OUTPATIENT)
Dept: MEDICAL GROUP | Facility: LAB | Age: 40
End: 2025-01-13
Payer: COMMERCIAL

## 2025-01-13 VITALS
RESPIRATION RATE: 16 BRPM | DIASTOLIC BLOOD PRESSURE: 86 MMHG | HEIGHT: 72 IN | BODY MASS INDEX: 30.28 KG/M2 | TEMPERATURE: 98.2 F | WEIGHT: 223.55 LBS | SYSTOLIC BLOOD PRESSURE: 132 MMHG | HEART RATE: 92 BPM | OXYGEN SATURATION: 97 %

## 2025-01-13 DIAGNOSIS — M51.26 HERNIATION OF INTERVERTEBRAL DISC BETWEEN L4 AND L5: ICD-10-CM

## 2025-01-13 DIAGNOSIS — M54.32 SCIATICA OF LEFT SIDE: ICD-10-CM

## 2025-01-13 PROCEDURE — 99213 OFFICE O/P EST LOW 20 MIN: CPT | Performed by: INTERNAL MEDICINE

## 2025-01-13 PROCEDURE — 3075F SYST BP GE 130 - 139MM HG: CPT | Performed by: INTERNAL MEDICINE

## 2025-01-13 PROCEDURE — 3079F DIAST BP 80-89 MM HG: CPT | Performed by: INTERNAL MEDICINE

## 2025-01-13 RX ORDER — OXYCODONE HYDROCHLORIDE 5 MG/1
5 TABLET ORAL EVERY 4 HOURS PRN
Qty: 20 TABLET | Refills: 0 | Status: SHIPPED | OUTPATIENT
Start: 2025-01-13 | End: 2025-01-20

## 2025-01-13 ASSESSMENT — FIBROSIS 4 INDEX: FIB4 SCORE: .5967741935483870968

## 2025-01-13 ASSESSMENT — PATIENT HEALTH QUESTIONNAIRE - PHQ9: CLINICAL INTERPRETATION OF PHQ2 SCORE: 0

## 2025-01-13 NOTE — PROGRESS NOTES
CC: Maury Kline is a 39 y.o. male is suffering from   Chief Complaint   Patient presents with    Follow-Up         SUBJECTIVE:  1. Sciatica of left side  Maury is here for follow-up, is accompanied by his daughter, we have discussed that he is suffering from sciatica involving his left leg    2. Herniation of intervertebral disc between L4 and L5  Patient with previous evaluation with Dr. Sonu Chacko, we have discussed that he does have a disc herniation down the spinal canal at 4 5, patient was offered neurosurgical intervention has declined at this point would like to continue with physical therapy.        Past social, family, history: ,   Social History     Tobacco Use    Smoking status: Never    Smokeless tobacco: Never    Tobacco comments:     once in a while   Vaping Use    Vaping status: Never Used   Substance Use Topics    Alcohol use: Yes     Alcohol/week: 4.2 oz     Types: 7 Cans of beer per week     Comment: daily    Drug use: Not Currently         MEDICATIONS:    Current Outpatient Medications:     oxyCODONE immediate-release (ROXICODONE) 5 MG Tab, Take 1 Tablet by mouth every four hours as needed for Severe Pain for up to 7 days., Disp: 20 Tablet, Rfl: 0    methocarbamol (ROBAXIN) 750 MG Tab, Take 1 Tablet by mouth every 6 hours as needed (Muscle spasm)., Disp: 20 Tablet, Rfl: 0    docusate sodium (COLACE) 100 MG Cap, Take 1 Capsule by mouth 2 times a day as needed for Constipation (Take to prevent opioid-induced constipation)., Disp: 30 Capsule, Rfl: 0    hydrocodone-acetaminophen (VICODIN) 5-500 MG TABS, Take 1-2 Tabs by mouth every four hours as needed for 20 doses., Disp: 20 Each, Rfl: 0    albuterol (PROVENTIL) 2.5mg/3ml Nebu Soln solution for nebulization, Take 3 mL by nebulization every four hours as needed for Shortness of Breath., Disp: 30 Each, Rfl: 0    ibuprofen (MOTRIN) 600 MG Tab, Take 1 Tablet by mouth every 6 hours as needed for Mild Pain or Moderate Pain.,  Disp: 30 Tablet, Rfl: 0    lidocaine (ASPERFLEX) 4 % Patch, Place 1 Patch on the skin every 12 hours., Disp: 1 Patch, Rfl: 0    methocarbamol (ROBAXIN) 500 MG Tab, Take 1 to 2 tablets every 6 hours as needed for pain/muscle spasms. (Patient not taking: Reported on 1/13/2025), Disp: 15 Tablet, Rfl: 0    amoxicillin-clavulanate (AUGMENTIN) 875-125 MG Tab, Take 1 Tablet by mouth 2 times a day. With food (Patient not taking: Reported on 1/13/2025), Disp: 20 Tablet, Rfl: 0    ketorolac (TORADOL) 10 MG Tab, Take 1 Tablet by mouth 3 times a day as needed for Moderate Pain. With food (Patient not taking: Reported on 1/13/2025), Disp: 15 Tablet, Rfl: 0    meclizine (ANTIVERT) 25 MG Tab, Take 1 Tablet by mouth 3 times a day as needed for Vertigo. (Patient not taking: Reported on 1/13/2025), Disp: 21 Tablet, Rfl: 0    PROBLEMS:  There are no active problems to display for this patient.      REVIEW OF SYSTEMS:  Gen.:  No Nausea, Vomiting, fever, Chills.  Heart: No chest pain.  Lungs:  No shortness of Breath.  Psychological: Neil unusual Anxiety depression     PHYSICAL EXAM      Constitutional: Alert, cooperative, not in acute distress.  Cardiovascular:  Rate Rhythm is regular without murmurs rubs clicks.     Thorax & Lungs: Clear to auscultation, no wheezing, rhonchi, or rales  HENT: Normocephalic, Atraumatic.  Eyes: PERRLA, EOMI, Conjunctiva normal.   Neck: Trachia is midline no swelling of the thyroid.   Lymphatic: No lymphadenopathy noted.   Neurologic: Alert & oriented x 3, cranial nerves II through XII are intact, Normal motor function, Normal sensory function, No focal deficits noted.   Psychiatric: Affect normal, Judgment normal, Mood normal.     VITAL SIGNS:/86 (BP Location: Left arm, Patient Position: Sitting, BP Cuff Size: Adult)   Pulse 92   Temp 36.8 °C (98.2 °F) (Temporal)   Resp 16   Ht 1.829 m (6')   Wt 101 kg (223 lb 8.7 oz)   SpO2 97%   BMI 30.32 kg/m²     Labs: Reviewed    Assessment:                                                      Plan:     1. Sciatica of left side  Recheck CBC comprehensive metabolic panel  - CBC WITH DIFFERENTIAL; Future  - Comp Metabolic Panel; Future  - oxyCODONE immediate-release (ROXICODONE) 5 MG Tab; Take 1 Tablet by mouth every four hours as needed for Severe Pain for up to 7 days.  Dispense: 20 Tablet; Refill: 0    2. Herniation of intervertebral disc between L4 and L5  Referral to neurosurgery upon request continue with Roxicodone, DMV handicap placard written for  - CBC WITH DIFFERENTIAL; Future  - Comp Metabolic Panel; Future  - oxyCODONE immediate-release (ROXICODONE) 5 MG Tab; Take 1 Tablet by mouth every four hours as needed for Severe Pain for up to 7 days.  Dispense: 20 Tablet; Refill: 0

## 2025-02-14 ENCOUNTER — PATIENT MESSAGE (OUTPATIENT)
Dept: MEDICAL GROUP | Facility: LAB | Age: 40
End: 2025-02-14
Payer: COMMERCIAL

## 2025-02-14 DIAGNOSIS — M51.26 HERNIATION OF INTERVERTEBRAL DISC BETWEEN L4 AND L5: ICD-10-CM

## 2025-02-14 DIAGNOSIS — M54.32 SCIATICA OF LEFT SIDE: ICD-10-CM

## 2025-02-14 RX ORDER — IBUPROFEN 600 MG/1
600 TABLET, FILM COATED ORAL EVERY 6 HOURS PRN
Qty: 30 TABLET | Refills: 0 | Status: CANCELLED | OUTPATIENT
Start: 2025-02-14

## 2025-02-14 RX ORDER — METHOCARBAMOL 750 MG/1
750 TABLET, FILM COATED ORAL EVERY 6 HOURS PRN
Qty: 20 TABLET | Refills: 0 | Status: CANCELLED | OUTPATIENT
Start: 2025-02-14

## 2025-02-24 RX ORDER — IBUPROFEN 600 MG/1
600 TABLET, FILM COATED ORAL EVERY 6 HOURS PRN
Qty: 30 TABLET | Refills: 0 | Status: SHIPPED | OUTPATIENT
Start: 2025-02-24

## 2025-02-24 RX ORDER — METHOCARBAMOL 750 MG/1
750 TABLET, FILM COATED ORAL EVERY 6 HOURS PRN
Qty: 20 TABLET | Refills: 0 | Status: SHIPPED | OUTPATIENT
Start: 2025-02-24

## 2025-02-26 ENCOUNTER — PATIENT MESSAGE (OUTPATIENT)
Dept: MEDICAL GROUP | Facility: LAB | Age: 40
End: 2025-02-26
Payer: COMMERCIAL

## 2025-03-20 ENCOUNTER — OFFICE VISIT (OUTPATIENT)
Dept: MEDICAL GROUP | Facility: LAB | Age: 40
End: 2025-03-20
Payer: COMMERCIAL

## 2025-03-20 ENCOUNTER — TELEPHONE (OUTPATIENT)
Dept: MEDICAL GROUP | Facility: LAB | Age: 40
End: 2025-03-20

## 2025-03-20 VITALS
BODY MASS INDEX: 29.12 KG/M2 | SYSTOLIC BLOOD PRESSURE: 168 MMHG | HEIGHT: 72 IN | HEART RATE: 108 BPM | WEIGHT: 215 LBS | OXYGEN SATURATION: 97 % | TEMPERATURE: 97.4 F | DIASTOLIC BLOOD PRESSURE: 110 MMHG | RESPIRATION RATE: 18 BRPM

## 2025-03-20 DIAGNOSIS — M54.10 BACK PAIN WITH LEFT-SIDED RADICULOPATHY: ICD-10-CM

## 2025-03-20 DIAGNOSIS — M51.26 HERNIATION OF INTERVERTEBRAL DISC BETWEEN L4 AND L5: ICD-10-CM

## 2025-03-20 PROCEDURE — 3080F DIAST BP >= 90 MM HG: CPT | Performed by: FAMILY MEDICINE

## 2025-03-20 PROCEDURE — 3077F SYST BP >= 140 MM HG: CPT | Performed by: FAMILY MEDICINE

## 2025-03-20 PROCEDURE — 99213 OFFICE O/P EST LOW 20 MIN: CPT | Performed by: FAMILY MEDICINE

## 2025-03-20 RX ORDER — OXYCODONE HYDROCHLORIDE 5 MG/1
5 TABLET ORAL EVERY 6 HOURS PRN
Qty: 28 TABLET | Refills: 0 | Status: SHIPPED | OUTPATIENT
Start: 2025-03-20 | End: 2025-03-27

## 2025-03-20 RX ORDER — OXYCODONE HYDROCHLORIDE 5 MG/1
5 TABLET ORAL EVERY 6 HOURS PRN
Qty: 28 TABLET | Refills: 0 | Status: SHIPPED | OUTPATIENT
Start: 2025-03-20 | End: 2025-03-20 | Stop reason: SDUPTHER

## 2025-03-20 ASSESSMENT — FIBROSIS 4 INDEX: FIB4 SCORE: .5967741935483870968

## 2025-03-20 NOTE — TELEPHONE ENCOUNTER
Patient is requesting for prescription to get sent to [facility] on [address]. [facility] is having issues receiving it

## 2025-03-20 NOTE — PROGRESS NOTES
Subjective:     Chief Complaint   Patient presents with    Pain     Medication, left side          HPI:   Maury presents today for left low back pain with sciatica. MRI November 2024 showed:   IMPRESSION:     1.  Probable acute left posterior paramedian disc herniation with small extruded disc fragment extending caudally in the ventral epidural space proximal 0.9 mm. There is slight extension of the disc fragment into the left L4-5 inferior foraminal recess   which may compress left L4 nerve root as it exits the canal as well as additional left-sided descending nerve roots.  2.  Additional mild degenerative changes at the L5-S1 level.  3.  No acute fracture or malalignment.    Had bene doing methocarbomol, ibuprofen, oxycodone at one point. Muscle relaxers didn't help much.     TRUONG schedule for next Wednesday.   On steroid pack right now as well.   Was doing ok until this past weekend.         Current Outpatient Medications Ordered in Epic   Medication Sig Dispense Refill    oxyCODONE immediate-release (ROXICODONE) 5 MG Tab Take 1 Tablet by mouth every 6 hours as needed for Severe Pain for up to 7 days. 28 Tablet 0    methocarbamol (ROBAXIN) 750 MG Tab Take 1 Tablet by mouth every 6 hours as needed (Muscle spasm). 20 Tablet 0    ibuprofen (MOTRIN) 600 MG Tab Take 1 Tablet by mouth every 6 hours as needed for Mild Pain or Moderate Pain. 30 Tablet 0    albuterol (PROVENTIL) 2.5mg/3ml Nebu Soln solution for nebulization Take 3 mL by nebulization every four hours as needed for Shortness of Breath. 30 Each 0    lidocaine (ASPERFLEX) 4 % Patch Place 1 Patch on the skin every 12 hours. 1 Patch 0    docusate sodium (COLACE) 100 MG Cap Take 1 Capsule by mouth 2 times a day as needed for Constipation (Take to prevent opioid-induced constipation). 30 Capsule 0     No current Epic-ordered facility-administered medications on file.         ROS:  Gen: no fevers/chills, no changes in weight  Eyes: no changes in vision  ENT: no  sore throat, no hearing loss, no bloody nose  Pulm: no sob, no cough  CV: no chest pain, no palpitations  GI: no nausea/vomiting, no diarrhea  : no dysuria  Skin: no rash  Neuro: no headaches, no numbness/tingling  Heme/Lymph: no easy bruising      Objective:     Exam:  BP (!) 168/110   Pulse (!) 108   Temp 36.3 °C (97.4 °F)   Resp 18   Ht 1.829 m (6')   Wt 97.5 kg (215 lb)   SpO2 97%   BMI 29.16 kg/m²  Body mass index is 29.16 kg/m².    Gen: AAOx3, NAD, well appearing  HEENT: NCAT, EOMI, Nares patent, Mucosa moist  Resp: Normal chest wall rise and fall, not SOB, no tachypnea  Skin: no rash or abnormality of visible skin.   Psych: normal speech, not slurred, good insight, affect full  Msk: difficulty being static, sitting or standing.   Pain left buttock to knee and below.     Assessment & Plan:     39 y.o. male with the following -     1. Herniation of intervertebral disc between L4 and L5  Discussed continue with the steroid pack given to him by his neurosurgeon.  I do recommend continuing with plans for the epidural steroid injection as well.  Will get him a weeks worth of pain medication as well.  PDMP reviewed.  No concerns for misuse or abuse.  - oxyCODONE immediate-release (ROXICODONE) 5 MG Tab; Take 1 Tablet by mouth every 6 hours as needed for Severe Pain for up to 7 days.  Dispense: 28 Tablet; Refill: 0    2. Back pain with left-sided radiculopathy    - oxyCODONE immediate-release (ROXICODONE) 5 MG Tab; Take 1 Tablet by mouth every 6 hours as needed for Severe Pain for up to 7 days.  Dispense: 28 Tablet; Refill: 0            No follow-ups on file.    Please note that this dictation was created using voice recognition software. I have made every reasonable attempt to correct obvious errors, but I expect that there are errors of grammar and possibly content that I did not discover before finalizing the note.

## 2025-03-25 ENCOUNTER — PATIENT MESSAGE (OUTPATIENT)
Dept: MEDICAL GROUP | Facility: LAB | Age: 40
End: 2025-03-25
Payer: COMMERCIAL

## 2025-03-25 RX ORDER — METOPROLOL SUCCINATE 25 MG/1
25 TABLET, EXTENDED RELEASE ORAL DAILY
Qty: 30 TABLET | Refills: 1 | Status: SHIPPED | OUTPATIENT
Start: 2025-03-25